# Patient Record
Sex: MALE | Race: WHITE | NOT HISPANIC OR LATINO | Employment: FULL TIME | URBAN - METROPOLITAN AREA
[De-identification: names, ages, dates, MRNs, and addresses within clinical notes are randomized per-mention and may not be internally consistent; named-entity substitution may affect disease eponyms.]

---

## 2019-06-07 ENCOUNTER — HISTORICAL (OUTPATIENT)
Dept: ADMINISTRATIVE | Facility: HOSPITAL | Age: 19
End: 2019-06-07

## 2019-06-09 LAB — FINAL CULTURE: NORMAL

## 2022-04-10 ENCOUNTER — HISTORICAL (OUTPATIENT)
Dept: ADMINISTRATIVE | Facility: HOSPITAL | Age: 22
End: 2022-04-10

## 2022-04-30 VITALS
DIASTOLIC BLOOD PRESSURE: 68 MMHG | BODY MASS INDEX: 28.58 KG/M2 | WEIGHT: 193 LBS | HEIGHT: 69 IN | OXYGEN SATURATION: 98 % | SYSTOLIC BLOOD PRESSURE: 103 MMHG

## 2023-07-31 ENCOUNTER — HOSPITAL ENCOUNTER (EMERGENCY)
Facility: HOSPITAL | Age: 23
Discharge: HOME | End: 2023-07-31
Attending: INTERNAL MEDICINE

## 2023-07-31 VITALS
HEIGHT: 69 IN | BODY MASS INDEX: 30.83 KG/M2 | DIASTOLIC BLOOD PRESSURE: 79 MMHG | OXYGEN SATURATION: 99 % | WEIGHT: 208.19 LBS | TEMPERATURE: 98 F | SYSTOLIC BLOOD PRESSURE: 135 MMHG | HEART RATE: 75 BPM | RESPIRATION RATE: 18 BRPM

## 2023-07-31 DIAGNOSIS — S09.90XA INJURY OF HEAD, INITIAL ENCOUNTER: Primary | ICD-10-CM

## 2023-07-31 DIAGNOSIS — T14.90XA TRAUMA: ICD-10-CM

## 2023-07-31 PROCEDURE — 99284 EMERGENCY DEPT VISIT MOD MDM: CPT | Mod: 25

## 2023-07-31 NOTE — Clinical Note
"Cara "Kaz Castillo was seen and treated in our emergency department on 7/31/2023.  He may return to work on 08/02/2023.  May return to work sooner if patient has no symptoms when he awakes tomorrow.     If you have any questions or concerns, please don't hesitate to call.      IRWIN Alejandra"

## 2023-07-31 NOTE — ED PROVIDER NOTES
Encounter Date: 7/31/2023       History     Chief Complaint   Patient presents with    Head Injury     Hit by an escavator at work.  Unknown of LOC.  Right side pain with lump.  Aleve laura ramirez     Patient is a 22-year-old male comes into the emergency room with complaints of right-sided head pain after he was hit by an extubate her at work.  He reports he was playing some conduit down the ground and there was a large hole in the excavator slid down into the hole causing the bucket of the boom to slide and hit his head.  He was dazed and blacked out but did not lose consciousness and did not fall.  He states besides headache he otherwise feels okay.  He denies any other complaints or associated symptoms.      Review of patient's allergies indicates:  No Known Allergies  No past medical history on file.  No past surgical history on file.  No family history on file.     Review of Systems   Constitutional:  Negative for activity change, appetite change and fever.   HENT:  Negative for congestion, dental problem and sore throat.    Eyes:  Negative for discharge and itching.   Respiratory:  Negative for apnea, chest tightness and shortness of breath.    Cardiovascular:  Negative for chest pain.   Gastrointestinal:  Negative for nausea.   Genitourinary:  Negative for dysuria.   Musculoskeletal:  Negative for back pain.   Skin:  Negative for rash.   Neurological:  Positive for headaches. Negative for dizziness, facial asymmetry and weakness.   Hematological:  Does not bruise/bleed easily.   Psychiatric/Behavioral:  Negative for agitation and behavioral problems.    All other systems reviewed and are negative.      Physical Exam     Initial Vitals [07/31/23 1558]   BP Pulse Resp Temp SpO2   135/79 75 18 98.2 °F (36.8 °C) 99 %      MAP       --         Physical Exam    Nursing note and vitals reviewed.  Constitutional: Vital signs are normal. He appears well-developed and well-nourished.  Non-toxic appearance. He does not  have a sickly appearance.   HENT:   Head: Normocephalic and atraumatic.   Right Ear: External ear normal.   Left Ear: External ear normal.   Eyes: Conjunctivae, EOM and lids are normal. Pupils are equal, round, and reactive to light. Lids are everted and swept, no foreign bodies found.   Neck: Trachea normal and phonation normal. Neck supple. No thyroid mass and no thyromegaly present.   Normal range of motion.   Full passive range of motion without pain.     Cardiovascular:  Normal rate, regular rhythm, S1 normal, S2 normal, normal heart sounds, intact distal pulses and normal pulses.           Pulmonary/Chest: Breath sounds normal. No respiratory distress.   Abdominal: Abdomen is soft. There is no abdominal tenderness.   Musculoskeletal:         General: Tenderness present.      Cervical back: Full passive range of motion without pain, normal range of motion and neck supple.      Comments: Right-sided head pain.  No visual abnormality on the skin including any bruising swelling or deformity.     Lymphadenopathy:     He has no cervical adenopathy.   Neurological: He is alert and oriented to person, place, and time. He has normal strength. GCS score is 15. GCS eye subscore is 4. GCS verbal subscore is 5. GCS motor subscore is 6.   Skin: Skin is warm, dry and intact. Capillary refill takes less than 2 seconds.   Psychiatric: He has a normal mood and affect. His speech is normal and behavior is normal. Judgment normal. Cognition and memory are normal.         ED Course   Procedures  Labs Reviewed - No data to display       Imaging Results              CT Head Without Contrast (Final result)  Result time 07/31/23 16:47:14      Final result by Jayy Leon MD (07/31/23 16:47:14)                   Impression:      1. No acute intracranial abnormality identified  2. Mucosal thickening at the ethmoid and sphenoid sinuses.  3. Mild bilateral mastoiditis.      Electronically signed by: Jayy  Edward  Date:    07/31/2023  Time:    16:47               Narrative:    EXAMINATION:  CT HEAD WITHOUT CONTRAST    CLINICAL HISTORY:  Head trauma, minor, normal mental status (Age 19-64y);, .    TECHNIQUE:  PATIENT RADIATION DOSE: DLP(mGycm) 01/02/2007    As per PQRS measures, all CT scans at this facility used dose modulation, iterative reconstruction, and/or weight based dose adjustment when appropriate to reduce radiation dose to as low as reasonably achievable.    COMPARISON:  None available.    FINDINGS:  Serial axial images were obtained of the head without the administration of IV contrast. Both brain and bone parenchymal windows were obtained.  Additional coronal and sagittal reconstructions were obtained.  Ventricles, cisterns, and sulci are within normal limits.  There is no evidence of intracranial hemorrhage, midline shift, mass effect, or abnormal extra-axial fluid collections.  There is mild scattered mucosal thickening at the ethmoid and sphenoid sinus.  Mastoid air cells demonstrate partial fluid signal intensity bilaterally.  External auditory canals are grossly patent.  No acute calvarial fracture is seen.                                       CT Maxillofacial Without Contrast (Final result)  Result time 07/31/23 16:50:46      Final result by Jayy Leon MD (07/31/23 16:50:46)                   Impression:      1. No acute osseous defect identified  2. Left lateral deviation of the nasal septum with bony spur on the left  3. Mild scattered mucosal thickening at the ethmoid and sphenoid sinuses as well as the left frontal sinus  4. Mild fluid density at the mastoid air cells bilaterally suggesting a mild bilateral mastoiditis      Electronically signed by: Jayy Leon  Date:    07/31/2023  Time:    16:50               Narrative:    EXAMINATION:  CT SCAN OF THE SINUSES/FACIAL BONES:    CLINICAL HISTORY:  , Maxillofacial pain;TMJ pain or limited movement;    TECHNIQUE:  As per PQRS measures,  all CT scans at this facility used dose modulation, iterative reconstruction, and/or weight based dose adjustment when appropriate to reduce radiation dose to as low as reasonably achievable.    PATIENT RADIATION DOSE: DLP(mGycm) 01/02/2007    COMPARISON:  None available    FINDINGS:  Serial axial, saggital,  and coronal images were obtained of the paranasal sinuses and facial bones without the administration of intravenous contrast.  Bone and soft tissue windows were performed.There is mild motion artifact.  There is mild scattered mucosal thickening at the ethmoid and sphenoid sinuses as well as the left frontal sinus.  No air-fluid levels are identified.  Zygomatic arches are intact.  Temporomandibular joints are intact.  There is mild fluid density at the mastoid air cells bilaterally.  External auditory canals and middle ears are relatively clear.  Zygomatic arches are intact.  Ostiomeatal complexes are grossly patent.  There is left lateral deviation of the nasal septum with bony spur on the left.                                       Medications - No data to display                    Medical Decision Making  22-year-old male came to the emergency room for evaluation of head pain after he was struck by a piece of work equipment.  He denies any LOC but did get dazed and lost vision after the head injury momentarily.  He denies any changes in vision blurred vision.    Problems Addressed:  Injury of head, initial encounter: acute illness or injury     Details: CT face and head were done with no major abnormalities.  Discussed symptomatic treatment of his headache including Tylenol Motrin.  Discussed rest.  Discussed strict ED return precautions for any changing worsening symptoms.  Patient was aware plan of care and had no other questions or concerns prior to discharge.    Amount and/or Complexity of Data Reviewed  External Data Reviewed: labs, radiology and notes.  Radiology: ordered.            Clinical  Impression:   Final diagnoses:  [T14.90XA] Trauma  [S09.90XA] Injury of head, initial encounter (Primary)        ED Disposition Condition    Discharge Stable          ED Prescriptions    None       Follow-up Information    None          IRWIN Alejandra  07/31/23 6552

## 2024-09-12 ENCOUNTER — HOSPITAL ENCOUNTER (EMERGENCY)
Facility: HOSPITAL | Age: 24
Discharge: HOME | End: 2024-09-12
Attending: EMERGENCY MEDICINE
Payer: MEDICAID

## 2024-09-12 VITALS
HEART RATE: 92 BPM | BODY MASS INDEX: 32.56 KG/M2 | OXYGEN SATURATION: 99 % | TEMPERATURE: 98 F | RESPIRATION RATE: 16 BRPM | SYSTOLIC BLOOD PRESSURE: 135 MMHG | DIASTOLIC BLOOD PRESSURE: 81 MMHG | WEIGHT: 220.44 LBS

## 2024-09-12 DIAGNOSIS — F98.8 ATTENTION DEFICIT DISORDER (ADD) IN ADULT: ICD-10-CM

## 2024-09-12 DIAGNOSIS — Z76.0 ENCOUNTER FOR MEDICATION REFILL: Primary | ICD-10-CM

## 2024-09-12 PROCEDURE — 99281 EMR DPT VST MAYX REQ PHY/QHP: CPT

## 2024-09-12 NOTE — ED PROVIDER NOTES
Encounter Date: 9/12/2024       History     Chief Complaint   Patient presents with    Medication Refill     Pt requesting rx for Vyvanse, no PCP.       Cara Castillo is a 24 y/o male with a PMD of  bipolar and adhd. He is here to establish care with a PCP. He is finding it more difficult to concentrate at work and be productive. He has been off of his medication for a few years, he does not believe his diagnosis of bipolar is correct. He does not remember what he took for his bipolar diagnosis and he took vyvanse for his ADHD which he fines helped him in school. He is currently going through a divorce which is exacerbating his ADHD. He denies SI, HI, hallucinations, delusions, sadness, anxiety, chest pain, sob, headaches. No other complaints.         Review of patient's allergies indicates:  No Known Allergies  No past medical history on file.  No past surgical history on file.  No family history on file.  Social History     Tobacco Use    Smoking status: Every Day     Types: Vaping with nicotine    Smokeless tobacco: Never   Substance Use Topics    Drug use: Not Currently     Review of Systems   Constitutional: Negative.         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   HENT: Negative.     Eyes: Negative.    Respiratory: Negative.     Cardiovascular: Negative.    Gastrointestinal: Negative.    Genitourinary: Negative.    Musculoskeletal: Negative.    Skin: Negative.    Neurological: Negative.    Psychiatric/Behavioral: Negative.         Physical Exam     Initial Vitals [09/12/24 1145]   BP Pulse Resp Temp SpO2   135/81 92 16 97.9 °F (36.6 °C) 99 %      MAP       --         Physical Exam    Nursing note and vitals reviewed.  Constitutional: He appears well-developed and well-nourished. He is not diaphoretic. He is cooperative.  Non-toxic appearance. He does not have a sickly appearance. He does not appear ill. No distress.   Pleasant,  cooperative, normal speech, GCS 15, no acute distress   HENT:   Head: Normocephalic and atraumatic.   Right Ear: Hearing, tympanic membrane and external ear normal.   Left Ear: Hearing, tympanic membrane and external ear normal.   Nose: Nose normal.   Mouth/Throat: Uvula is midline, oropharynx is clear and moist and mucous membranes are normal.   Eyes: Conjunctivae and EOM are normal. Pupils are equal, round, and reactive to light. No scleral icterus.   Neck: Trachea normal and phonation normal. Neck supple.   Normal range of motion.  Cardiovascular:  Normal rate, regular rhythm, normal heart sounds, intact distal pulses and normal pulses.           Pulmonary/Chest: Effort normal and breath sounds normal. No accessory muscle usage. No apnea, no tachypnea and no bradypnea. No respiratory distress. He has no decreased breath sounds. He has no wheezes. He has no rhonchi. He has no rales. He exhibits no tenderness.   Normal effort, symmetrical chest rise and fall, no accessory muscle use. Bilateral clear breath sounds in all fields anterior and posterior.      Abdominal: Abdomen is soft. Bowel sounds are normal. He exhibits no distension. There is no abdominal tenderness.   Abdomen is soft, nontender, no peritoneal signs. Tolerating PO fluids.      No right CVA tenderness.  No left CVA tenderness. There is no rebound, no guarding, no tenderness at McBurney's point and negative Ewing's sign. negative psoas sign and negative Rovsing's sign  Musculoskeletal:         General: Normal range of motion.      Cervical back: Normal range of motion and neck supple.     Neurological: He is alert and oriented to person, place, and time. He has normal strength. Gait normal. GCS score is 15. GCS eye subscore is 4. GCS verbal subscore is 5. GCS motor subscore is 6.   Skin: Skin is warm and dry. Capillary refill takes less than 2 seconds. No rash noted.   Psychiatric: He has a normal mood and affect. His speech is normal and behavior  is normal. Judgment and thought content normal. He is not actively hallucinating. Cognition and memory are normal.   Answers all questions appropriately, normal tone and rate.  SAD PERSONS Scale    Male: 1   Age < 19 or > 45: 0  Depression 0  Previous attempts or psych care: 0   Excess alcohol or drugs use:0   Rational thinking lost (psychosis):0  ,  or :1  Organized or serious SI attempt:0  No social support:0  Stated future intent to harm self:0    Total: 2  Appropriate for discharge to home with planned follow up          He is attentive.         ED Course   Procedures  Labs Reviewed - No data to display       Imaging Results    None          Medications - No data to display  Medical Decision Making  Schizophrenia exacerbation, bipolar psychosis, drug induced psychosis, thyrotoxicosis, renal failure, liver failure, toxydrome, among others    Denies SI, HI, hallucinations, delusions  Referral to internal medicine for PCP services  Explained I would not be able to give him vyvanse prescription, he would need to establish care with a primary care provider, he is agreeable to the plan.and follow up with internal med clinic.    Patient is nontoxic appearing, no acute distress, stable vital signs.   The patient is resting comfortably in no acute distress.  hemodynamically stable and is without objective evidence for acute process requiring urgent intervention or hospitalization. I provided counseling to patient with regard to condition, the treatment plan, specific conditions for return, and the importance of follow up. Detailed written and verbal instructions provided to patient and he expressed a verbal understanding of the discharge instructions and overall management plan. Reiterated the importance of medication administration and safety and advised patient to follow up with primary care provider in 3-5 days or sooner if needed. Answered questions at this time. The patient is stable for  discharge.                   ED Course as of 09/15/24 2114   Thu Sep 12, 2024   1301 pna [RQ]      ED Course User Index  [RQ] Nayana Sarabia FNP                           Clinical Impression:  Final diagnoses:  [Z76.0] Encounter for medication refill (Primary)  [F98.8] Attention deficit disorder (ADD) in adult          ED Disposition Condition    Discharge Stable          ED Prescriptions    None       Follow-up Information       Follow up With Specialties Details Why Contact Info [facility] Emergency Medicine  If symptoms worsen [address] [facility] [id number] [phone]    PCP   clinic will contact you to schedule appointment Follow up with PCP in 3-5 days.             Nayana Sarabia FNP  09/15/24 2114